# Patient Record
Sex: MALE | Race: WHITE | Employment: FULL TIME | URBAN - METROPOLITAN AREA
[De-identification: names, ages, dates, MRNs, and addresses within clinical notes are randomized per-mention and may not be internally consistent; named-entity substitution may affect disease eponyms.]

---

## 2021-03-02 ENCOUNTER — OFFICE VISIT (OUTPATIENT)
Dept: FAMILY MEDICINE CLINIC | Facility: CLINIC | Age: 26
End: 2021-03-02
Payer: COMMERCIAL

## 2021-03-02 VITALS
HEIGHT: 67 IN | DIASTOLIC BLOOD PRESSURE: 72 MMHG | BODY MASS INDEX: 27.15 KG/M2 | HEART RATE: 84 BPM | SYSTOLIC BLOOD PRESSURE: 120 MMHG | OXYGEN SATURATION: 98 % | WEIGHT: 173 LBS

## 2021-03-02 DIAGNOSIS — J45.20 MILD INTERMITTENT ASTHMA WITHOUT COMPLICATION: ICD-10-CM

## 2021-03-02 DIAGNOSIS — S49.91XA INJURY OF RIGHT SHOULDER, INITIAL ENCOUNTER: ICD-10-CM

## 2021-03-02 DIAGNOSIS — Z00.00 PHYSICAL EXAM, ANNUAL: Primary | ICD-10-CM

## 2021-03-02 DIAGNOSIS — F43.9 STRESS: ICD-10-CM

## 2021-03-02 PROCEDURE — 3008F BODY MASS INDEX DOCD: CPT | Performed by: FAMILY MEDICINE

## 2021-03-02 PROCEDURE — 3074F SYST BP LT 130 MM HG: CPT | Performed by: FAMILY MEDICINE

## 2021-03-02 PROCEDURE — 99385 PREV VISIT NEW AGE 18-39: CPT | Performed by: FAMILY MEDICINE

## 2021-03-02 PROCEDURE — 3078F DIAST BP <80 MM HG: CPT | Performed by: FAMILY MEDICINE

## 2021-03-02 RX ORDER — EPINEPHRINE 0.3 MG/.3ML
0.3 INJECTION SUBCUTANEOUS
COMMUNITY
Start: 2019-12-06

## 2021-03-02 NOTE — PROGRESS NOTES
Nay Camargo is a 32year old male who presents for a complete physical exam.   HPI:     Right shoulder has been popping in & out of place for past 2 months. Has been increasing exercise. Lots of pushups at home. Tweaks/pops with ER while arm is flexed. Cannabis           EXAM:   Wt Readings from Last 6 Encounters:  03/02/21 : 173 lb (78.5 kg)    Body mass index is 27.1 kg/m².     /72   Pulse 84   Ht 5' 7\" (1.702 m)   Wt 173 lb (78.5 kg)   SpO2 98%   BMI 27.10 kg/m²      GENERAL: well developed, wel booster every 10 years  -STI screening (GC/Chlamydia/HIV) which he declines    Meds & Refills for this Visit:  Requested Prescriptions      No prescriptions requested or ordered in this encounter       Imaging & Consults:  None    Return in 1 year for hira

## 2021-03-03 ENCOUNTER — TELEPHONE (OUTPATIENT)
Dept: FAMILY MEDICINE CLINIC | Facility: CLINIC | Age: 26
End: 2021-03-03

## 2021-03-03 NOTE — TELEPHONE ENCOUNTER
Letter completed sent to my chart. Called pt informed and provided activation for my chart. Pt verbalized understanding.

## 2021-06-20 ENCOUNTER — HOSPITAL ENCOUNTER (OUTPATIENT)
Age: 26
Discharge: HOME | End: 2021-06-20
Payer: COMMERCIAL

## 2021-06-20 VITALS
WEIGHT: 172 LBS | HEART RATE: 82 BPM | TEMPERATURE: 98 F | OXYGEN SATURATION: 99 % | DIASTOLIC BLOOD PRESSURE: 88 MMHG | HEIGHT: 67 IN | RESPIRATION RATE: 18 BRPM | SYSTOLIC BLOOD PRESSURE: 143 MMHG | BODY MASS INDEX: 27 KG/M2

## 2021-06-20 DIAGNOSIS — J02.9 SORE THROAT: ICD-10-CM

## 2021-06-20 DIAGNOSIS — Z87.09 HISTORY OF ASTHMA: ICD-10-CM

## 2021-06-20 DIAGNOSIS — J36 TONSILLAR ABSCESS: Primary | ICD-10-CM

## 2021-06-20 PROCEDURE — 87880 STREP A ASSAY W/OPTIC: CPT | Performed by: EMERGENCY MEDICINE

## 2021-06-20 PROCEDURE — 99204 OFFICE O/P NEW MOD 45 MIN: CPT | Performed by: EMERGENCY MEDICINE

## 2021-06-20 RX ORDER — HYDROCODONE BITARTRATE AND ACETAMINOPHEN 5; 325 MG/1; MG/1
1 TABLET ORAL ONCE
Status: COMPLETED | OUTPATIENT
Start: 2021-06-20 | End: 2021-06-20

## 2021-06-20 RX ORDER — PREDNISONE 20 MG/1
TABLET ORAL
Qty: 8 TABLET | Refills: 0 | Status: SHIPPED | OUTPATIENT
Start: 2021-06-21 | End: 2021-06-26

## 2021-06-20 RX ORDER — AMOXICILLIN AND CLAVULANATE POTASSIUM 875; 125 MG/1; MG/1
1 TABLET, FILM COATED ORAL 2 TIMES DAILY
Qty: 20 TABLET | Refills: 0 | Status: SHIPPED | OUTPATIENT
Start: 2021-06-20 | End: 2021-06-30

## 2021-06-20 RX ORDER — AMOXICILLIN AND CLAVULANATE POTASSIUM 875; 125 MG/1; MG/1
875 TABLET, FILM COATED ORAL ONCE
Status: COMPLETED | OUTPATIENT
Start: 2021-06-20 | End: 2021-06-20

## 2021-06-20 RX ORDER — PREDNISONE 20 MG/1
60 TABLET ORAL ONCE
Status: COMPLETED | OUTPATIENT
Start: 2021-06-20 | End: 2021-06-20

## 2021-06-20 RX ORDER — HYDROCODONE BITARTRATE AND ACETAMINOPHEN 5; 325 MG/1; MG/1
TABLET ORAL
Qty: 8 TABLET | Refills: 0 | Status: SHIPPED | OUTPATIENT
Start: 2021-06-20

## 2021-06-20 NOTE — ED PROVIDER NOTES
Patient Seen in: [facility]      History   No chief complaint on file.     Stated Complaint: Sore Throat    HPI/Subjective:   Romero Mcrae is a 32year old  male her for sore throat and swollen gland to the right side of his neck under his 18   Temp 97.5 °F (36.4 °C)   Temp src Temporal   SpO2 99 %   O2 Device        Current:/88   Pulse 82   Temp 97.5 °F (36.4 °C) (Temporal)   Resp 18   Ht 170.2 cm (5' 7\")   Wt 78 kg   SpO2 99%   BMI 26.94 kg/m²         Physical Exam  Vitals and nursi Fully vaccinated with no Covid exposure. Defer Covid testing. No hx of  immunocompromise. Nontoxic appearance. Patient with no trismus, muffled voice, or drooling. No airway compromise. Able to tolerate PO.   Gave prednisone, Norco, and first dose of A Sore throat  History of asthma     Disposition:  Discharge  6/20/2021 11:00 am    Follow-up:  DO Kira Cunningham  [facility] [address] [address]  [address] [place], [place] [address] Ruby  [address],[facility] [address]  [address] [address]  [phone]

## 2021-06-20 NOTE — ED INITIAL ASSESSMENT (HPI)
Pt states 2 days ago began having a sore throat and feeling swelling in right side of neck. Pt states painful to swallow.

## 2021-06-21 ENCOUNTER — OFFICE VISIT (OUTPATIENT)
Dept: OTOLARYNGOLOGY | Facility: CLINIC | Age: 26
End: 2021-06-21
Payer: COMMERCIAL

## 2021-06-21 VITALS
BODY MASS INDEX: 27 KG/M2 | SYSTOLIC BLOOD PRESSURE: 132 MMHG | HEART RATE: 69 BPM | HEIGHT: 67 IN | WEIGHT: 172 LBS | DIASTOLIC BLOOD PRESSURE: 75 MMHG

## 2021-06-21 DIAGNOSIS — J36 PERITONSILLAR CELLULITIS: Primary | ICD-10-CM

## 2021-06-21 PROCEDURE — 3078F DIAST BP <80 MM HG: CPT | Performed by: OTOLARYNGOLOGY

## 2021-06-21 PROCEDURE — 3075F SYST BP GE 130 - 139MM HG: CPT | Performed by: OTOLARYNGOLOGY

## 2021-06-21 PROCEDURE — 3008F BODY MASS INDEX DOCD: CPT | Performed by: OTOLARYNGOLOGY

## 2021-06-21 PROCEDURE — 99203 OFFICE O/P NEW LOW 30 MIN: CPT | Performed by: OTOLARYNGOLOGY

## 2021-06-21 NOTE — PROGRESS NOTES
Bard Moreno is a 32year old male. Patient presents with:   Tonsil Problem: peritonsillar abscess referred by David Coats pt started on prednisone, amoxicillin and norco for pain       HISTORY OF PRESENT ILLNESS  He presents with a history of throat infections, pigment change and rash. Hema/Lymph Negative Easy bleeding and easy bruising.            PHYSICAL EXAM    /75   Pulse 69   Ht 5' 7\" (1.702 m)   Wt 172 lb (78 kg)   BMI 26.94 kg/m²        Constitutional Normal Overall appearance - Normal conservatively. , Disp: 8 tablet, Rfl: 0  •  ALBUTEROL SULFATE HFA IN, Inhale into the lungs. , Disp: , Rfl:   •  EPINEPHrine 0.3 MG/0.3ML Injection Solution Auto-injector, Inject 0.3 mg into the muscle.  (Patient not taking: Reported on 6/20/2021 ), Disp:

## 2021-09-15 ENCOUNTER — TELEPHONE (OUTPATIENT)
Dept: FAMILY MEDICINE CLINIC | Facility: CLINIC | Age: 26
End: 2021-09-15

## 2021-09-15 DIAGNOSIS — H18.602 KERATOCONUS OF LEFT EYE: Primary | ICD-10-CM

## 2021-09-15 NOTE — TELEPHONE ENCOUNTER
Pt calling looking for a referral for Ophthalmology - Cornea specialist.    Contacted insurance and was given several provider names in [facility], but none of them treat his condition - Keratoconus.

## 2021-09-16 NOTE — TELEPHONE ENCOUNTER
Brice Giang, DO  [facility] [address] Dr. Elo Gomez 16 hours ago (4:16 PM)     Okay to place referral. Thank you. Message text    Referred placed informed pt and encouraged to call back on Monday to verify approval with insurance. Pt agrees.

## 2021-11-21 ENCOUNTER — HOSPITAL ENCOUNTER (OUTPATIENT)
Age: 26
Discharge: HOME | End: 2021-11-21
Payer: COMMERCIAL

## 2021-11-21 VITALS
SYSTOLIC BLOOD PRESSURE: 130 MMHG | TEMPERATURE: 97 F | OXYGEN SATURATION: 97 % | RESPIRATION RATE: 18 BRPM | HEART RATE: 65 BPM | DIASTOLIC BLOOD PRESSURE: 76 MMHG

## 2021-11-21 DIAGNOSIS — H10.32 ACUTE BACTERIAL CONJUNCTIVITIS OF LEFT EYE: Primary | ICD-10-CM

## 2021-11-21 PROCEDURE — 99213 OFFICE O/P EST LOW 20 MIN: CPT | Performed by: NURSE PRACTITIONER

## 2021-11-21 RX ORDER — ERYTHROMYCIN 5 MG/G
1 OINTMENT OPHTHALMIC EVERY 6 HOURS
Qty: 1 G | Refills: 0 | Status: SHIPPED | OUTPATIENT
Start: 2021-11-21 | End: 2021-11-28

## 2021-11-21 NOTE — ED PROVIDER NOTES
Patient Seen in: [facility]      History   Patient presents with:  Eye Problem    Stated Complaint: Pink Eye    Subjective:   Well-appearing 12-year-old male presents with complaints of waking up with left eye redness, and itching today oli pallor. Mucous membranes moist. Left and right tympanic membranes normal.      General: Lids are normal.         Right eye: No hordeolum. Left eye: No hordeolum. Extraocular Movements: Extraocular movements intact.       Conjunctiva/sclera: Medication List as of 11/21/2021 12:17 PM    START taking these medications    erythromycin 5 MG/GM Ophthalmic Ointment  Place 1 Application into the right eye every 6 (six) hours for 7 days. , Normal, Disp-1 g, R-0

## 2022-03-21 NOTE — TELEPHONE ENCOUNTER
EPINEPHrine 0.3 MG/0.3ML Injection Solution Auto-injector      ALBUTEROL SULFATE HFA IN      *both previously prescribed by outside provider.     [address] [facility] [address] [facility], [phone], [phone]    Last office visit:03/02/21

## 2022-03-21 NOTE — TELEPHONE ENCOUNTER
RN contacted pt, pt has not been seen in over a year. Pt scheduled physical with MP 4/5/22. Medications pended, pt unable to verify previous inhaler.  RN to route to .

## 2022-03-22 RX ORDER — ALBUTEROL SULFATE 90 UG/1
2 AEROSOL, METERED RESPIRATORY (INHALATION) EVERY 6 HOURS PRN
Qty: 1 EACH | Refills: 1 | Status: SHIPPED | OUTPATIENT
Start: 2022-03-22

## 2022-03-22 RX ORDER — EPINEPHRINE 0.3 MG/.3ML
0.3 INJECTION SUBCUTANEOUS ONCE
Qty: 1 EACH | Refills: 0 | Status: SHIPPED | OUTPATIENT
Start: 2022-03-22 | End: 2022-03-22

## 2022-04-05 ENCOUNTER — OFFICE VISIT (OUTPATIENT)
Dept: FAMILY MEDICINE CLINIC | Facility: CLINIC | Age: 27
End: 2022-04-05
Payer: COMMERCIAL

## 2022-04-05 VITALS
HEART RATE: 100 BPM | HEIGHT: 67 IN | OXYGEN SATURATION: 98 % | SYSTOLIC BLOOD PRESSURE: 136 MMHG | BODY MASS INDEX: 28.25 KG/M2 | DIASTOLIC BLOOD PRESSURE: 84 MMHG | WEIGHT: 180 LBS

## 2022-04-05 DIAGNOSIS — M53.80 BACK TIGHTNESS: ICD-10-CM

## 2022-04-05 DIAGNOSIS — Z01.00 ENCOUNTER FOR EXAMINATION OF VISION: ICD-10-CM

## 2022-04-05 DIAGNOSIS — K13.70 ORAL LESION: ICD-10-CM

## 2022-04-05 DIAGNOSIS — Z00.00 PHYSICAL EXAM, ANNUAL: Primary | ICD-10-CM

## 2022-04-05 PROCEDURE — 3075F SYST BP GE 130 - 139MM HG: CPT | Performed by: FAMILY MEDICINE

## 2022-04-05 PROCEDURE — 3008F BODY MASS INDEX DOCD: CPT | Performed by: FAMILY MEDICINE

## 2022-04-05 PROCEDURE — 99395 PREV VISIT EST AGE 18-39: CPT | Performed by: FAMILY MEDICINE

## 2022-04-05 PROCEDURE — 3079F DIAST BP 80-89 MM HG: CPT | Performed by: FAMILY MEDICINE

## 2022-07-26 RX ORDER — ALBUTEROL SULFATE 90 UG/1
2 AEROSOL, METERED RESPIRATORY (INHALATION) EVERY 6 HOURS PRN
Qty: 2 EACH | Refills: 1 | Status: SHIPPED | OUTPATIENT
Start: 2022-07-26

## 2022-07-26 NOTE — TELEPHONE ENCOUNTER
Patient stated he called a few weeks ago to the answering service for a refill request and did not hear anything back. He would like a refill for the following medication.  Patient stated wife is pregnant and her doctor recommended he get a t-dap.     albuterol

## 2022-08-11 ENCOUNTER — TELEPHONE (OUTPATIENT)
Dept: FAMILY MEDICINE CLINIC | Facility: CLINIC | Age: 27
End: 2022-08-11

## 2022-08-11 NOTE — TELEPHONE ENCOUNTER
The patient called and stated that he wants to have the tdap done before his baby arrives home at the end of the month. He also states it's a recommendation from his wife's OB.

## 2022-08-12 NOTE — TELEPHONE ENCOUNTER
Spoke to patient he states hes unsure of when he had his last tdap. Informed patient to try to find his vaccine record so we know if he is due for the tdap vaccine.

## 2022-08-22 ENCOUNTER — NURSE ONLY (OUTPATIENT)
Dept: FAMILY MEDICINE CLINIC | Facility: CLINIC | Age: 27
End: 2022-08-22
Payer: COMMERCIAL

## 2022-08-22 DIAGNOSIS — Z23 NEED FOR VACCINATION: Primary | ICD-10-CM

## 2022-08-22 PROCEDURE — 90471 IMMUNIZATION ADMIN: CPT | Performed by: FAMILY MEDICINE

## 2022-08-22 PROCEDURE — 90715 TDAP VACCINE 7 YRS/> IM: CPT | Performed by: FAMILY MEDICINE

## 2023-03-21 ENCOUNTER — HOSPITAL ENCOUNTER (OUTPATIENT)
Age: 28
Discharge: HOME | End: 2023-03-21
Payer: COMMERCIAL

## 2023-03-21 VITALS
SYSTOLIC BLOOD PRESSURE: 152 MMHG | OXYGEN SATURATION: 100 % | HEART RATE: 60 BPM | DIASTOLIC BLOOD PRESSURE: 85 MMHG | RESPIRATION RATE: 18 BRPM | TEMPERATURE: 99 F

## 2023-03-21 DIAGNOSIS — T78.40XA ALLERGIC REACTION, INITIAL ENCOUNTER: Primary | ICD-10-CM

## 2023-03-21 PROCEDURE — 99213 OFFICE O/P EST LOW 20 MIN: CPT | Performed by: NURSE PRACTITIONER

## 2023-03-21 RX ORDER — PREDNISONE 20 MG/1
40 TABLET ORAL DAILY
Qty: 8 TABLET | Refills: 0 | Status: SHIPPED | OUTPATIENT
Start: 2023-03-21 | End: 2023-03-25

## 2023-03-21 RX ORDER — PREDNISONE 20 MG/1
40 TABLET ORAL ONCE
Status: COMPLETED | OUTPATIENT
Start: 2023-03-21 | End: 2023-03-21

## 2023-03-21 RX ORDER — EPINEPHRINE 0.3 MG/.3ML
0.3 INJECTION SUBCUTANEOUS
Qty: 1 EACH | Refills: 0 | Status: SHIPPED | OUTPATIENT
Start: 2023-03-21 | End: 2023-04-20

## 2023-03-21 NOTE — ED INITIAL ASSESSMENT (HPI)
Pt is worried he is having an allergic reaction (itchy throat) from something he ingested. Pt began feeling an itchy throat approx 530p after eating dinner. Denies any new or unusual foods. Pt took 2 tablets of benadryl prior to coming to IC.

## 2023-03-21 NOTE — DISCHARGE INSTRUCTIONS
Prednisone 40 mg daily for 4 days, take with food  Zyrtec 10 mg daily for 1 week  If you develop chest pain, shortness of breath, vomiting, stomach pain, hives or any new or worsening symptoms please go to the ER  I have refilled your epinephrine, please use this in case of severe allergic reaction and then proceed to ER

## 2023-11-09 RX ORDER — ALBUTEROL SULFATE 90 UG/1
2 AEROSOL, METERED RESPIRATORY (INHALATION) EVERY 6 HOURS PRN
Qty: 2 EACH | Refills: 1 | Status: SHIPPED | OUTPATIENT
Start: 2023-11-09

## 2023-11-09 NOTE — TELEPHONE ENCOUNTER
Please review; protocol failed. Message sent for patient to make an appointment. Requested Prescriptions   Pending Prescriptions Disp Refills    albuterol 108 (90 Base) MCG/ACT Inhalation Aero Soln 2 each 1     Sig: Inhale 2 puffs into the lungs every 6 (six) hours as needed for Wheezing.        Asthma & COPD Medication Protocol Failed - 11/9/2023 10:51 AM        Failed - In person appointment or virtual visit in the past 6 mos or appointment in next 3 mos     Recent Outpatient Visits              1 year ago     [address], [place], [place] po[place]    Nurse Only    1 year ago Physical exam, annual    [facility]    Office Visit    2 years ago Peritonsillar cellulitis    Prashant Patel MD    Office Visit    2 years ago Physical exam, annual    Emily Beaulieu DO    Office Visit                         Recent Outpatient Visits              1 year ago     [address], [place], Emily poon    Nurse Only    1 year ago Physical exam, annual    Edgardo Beaulieu [facility], DO    Office Visit    2 years ago Peritonsillar cellulitis    Emily Beaulieu MD    Office Visit    2 years ago Physical exam, annual    [facility]    Office Visit

## 2023-11-09 NOTE — TELEPHONE ENCOUNTER
Patient is calling requesting refill on the following medication.       albuterol 108 (90 Base) MCG/ACT Inhalation Aero [id number] - Stefan Daugherty, [address] [facility] AT [address] [place] [address], [phone], [phone] Stable continue present therapy

## 2024-04-04 ENCOUNTER — TELEPHONE (OUTPATIENT)
Facility: CLINIC | Age: 29
End: 2024-04-04

## 2024-04-04 NOTE — TELEPHONE ENCOUNTER
The patient called for two referrals . The one 's he had in the chart  and he wanted to know if he needed to come in or can he have them renewed. He needs referrals for Ophthalmology and an ENT doctor.

## 2024-04-29 ENCOUNTER — OFFICE VISIT (OUTPATIENT)
Facility: CLINIC | Age: 29
End: 2024-04-29
Payer: COMMERCIAL

## 2024-04-29 VITALS
BODY MASS INDEX: 24.48 KG/M2 | SYSTOLIC BLOOD PRESSURE: 128 MMHG | HEART RATE: 85 BPM | OXYGEN SATURATION: 98 % | HEIGHT: 67 IN | WEIGHT: 156 LBS | DIASTOLIC BLOOD PRESSURE: 80 MMHG

## 2024-04-29 DIAGNOSIS — K13.70 ORAL LESION: ICD-10-CM

## 2024-04-29 DIAGNOSIS — Z00.00 PHYSICAL EXAM, ANNUAL: Primary | ICD-10-CM

## 2024-04-29 DIAGNOSIS — H18.609 KERATOCONUS, UNSPECIFIED LATERALITY: ICD-10-CM

## 2024-04-29 NOTE — PROGRESS NOTES
Paul Kovacs is a 29 year old male who presents for a complete physical exam.   HPI:     Needs referral for eye doctor. Never scheduled with ophtho.  Never scheduled with ENT either.     Otherwise doing well. Recently had another son who is now 1mo. Limited time. Will resume regular exercise soon though. Diet is okay. Eats lots of meat.     Concerns: Above  Last colonoscopy:  Never  Last PSA: Never  Diet/exercise: Above  Hx of STI screening: No hx or concerns  Substance abuse: None  Vaccines- Tdap: 08/2022, Covid: consider new booster    REVIEW OF SYSTEMS:   GENERAL: feels well otherwise   SKIN: denies any unusual skin lesions  EYES:denies vision change  HEENT: no hearing changes, negative  LUNGS: denies shortness of breath with exertion  CARDIOVASCULAR: denies chest pain on exertion  GI: denies abdominal pain, constipation or diarrhea. No hematochezia or melena  : denies nocturia or changes in stream  NEURO: denies headaches  PSYCHE: denies depression or anxiety    Current Outpatient Medications   Medication Sig Dispense Refill    albuterol 108 (90 Base) MCG/ACT Inhalation Aero Soln Inhale 2 puffs into the lungs every 6 (six) hours as needed for Wheezing. 2 each 1      Past Medical History:    COVID-19 vaccine series completed    second vaccine 04/29/2021. Series done at outside location.     Mild intermittent asthma without complication (HCC)      Past Surgical History:   Procedure Laterality Date    [place] teeth removed        History reviewed. No pertinent family history.   Social History:  Social History     Socioeconomic History    Marital status:    Tobacco Use    Smoking status: Never    Smokeless tobacco: Former   Vaping Use    Vaping status: Some Days   Substance and Sexual Activity    Alcohol use: Yes    Drug use: Yes     Types: Cannabis    Sexual activity: Yes           EXAM:     Wt Readings from Last 6 Encounters:   04/29/24 156 lb (70.8 kg)   04/05/22 180 lb (81.6 kg)   06/21/21 172 lb (78  kg)   06/20/21 172 lb (78 kg)   03/02/21 173 lb (78.5 kg)     Body mass index is 24.43 kg/m².    /80   Pulse 85   Ht 5' 7\" (1.702 m)   Wt 156 lb (70.8 kg)   SpO2 98%   BMI 24.43 kg/m²      GENERAL: well developed, well nourished, in no apparent distress  SKIN: no rashes, no suspicious lesions  HEENT: atraumatic, normocephalic, MMM, nose normal, Tms & EACs normal. Papule of left lower lip.  EYES: PERRLA, EOMI, no conjunctival injection  NECK: supple, no adenopathy   LUNGS: CTA b/l, no w/r/r  CARDIO: RRR without murmur  GI: normoactive bowel sounds, NT/ND, no masses, no HSM  EXTREMITIES: no cyanosis, clubbing or edema  NEURO: Alert & oriented, motor and sensory are grossly intact    No results found for: \"CHOLEST\", \"HDL\", \"LDL\", \"TRIGLY\", \"AST\", \"ALT\"   ASSESSMENT AND PLAN:   Paul Kovacs is a 29 year old male who presents for a complete physical exam.    Encounter Diagnoses   Name Primary?    Physical exam, annual Yes    Keratoconus, unspecified laterality     Oral lesion      Orders Placed This Encounter   Procedures    Hemoglobin A1C    Comp Metabolic Panel (14)    Lipid Panel    HCV Antibody       -Ophtho referral generated per request.  -Oral lesion: Again recommend ENT evaluation. Referral generated.  -Baseline labs ordered.     Discussed with patient the following:  -Risks and benefits of screening for prostate cancer:  -Colon cancer screening   -Healthy diet including adequate intake of vegetables and fruits, appropriate portion sizes, minimizing highly concentrated carbohydrate foods  -Exercising 30 minutes a day most days of the week   -Importance of regular exercise and weight maintenance/loss   -Diabetes screening   -Cholesterol screening   -Recommendation for yearly influenza vaccine  -Need for Tdap once as an adult and Td booster every 10 years  -Hepatitis C screening     Meds & Refills for this Visit:  Requested Prescriptions      No prescriptions requested or ordered in this encounter        Imaging & Consults:  OPHTHALMOLOGY - INTERNAL  ENT - INTERNAL    Return in 1 year for annual physical or sooner as needed.     Tapan Frias,   04/29/24   2:14 PM

## 2024-05-01 ENCOUNTER — OFFICE VISIT (OUTPATIENT)
Dept: OPHTHALMOLOGY | Facility: CLINIC | Age: 29
End: 2024-05-01

## 2024-05-01 ENCOUNTER — LAB ENCOUNTER (OUTPATIENT)
Dept: LAB | Facility: HOSPITAL | Age: 29
End: 2024-05-01
Attending: FAMILY MEDICINE
Payer: COMMERCIAL

## 2024-05-01 DIAGNOSIS — H52.13 MYOPIA OF BOTH EYES WITH ASTIGMATISM: Primary | ICD-10-CM

## 2024-05-01 DIAGNOSIS — Z00.00 PHYSICAL EXAM, ANNUAL: ICD-10-CM

## 2024-05-01 DIAGNOSIS — H52.203 MYOPIA OF BOTH EYES WITH ASTIGMATISM: Primary | ICD-10-CM

## 2024-05-01 LAB
ALBUMIN SERPL-MCNC: 4.6 G/DL (ref 3.2–4.8)
ALBUMIN/GLOB SERPL: 1.5 {RATIO} (ref 1–2)
ALP LIVER SERPL-CCNC: 48 U/L
ALT SERPL-CCNC: 14 U/L
ANION GAP SERPL CALC-SCNC: 4 MMOL/L (ref 0–18)
AST SERPL-CCNC: 25 U/L (ref ?–34)
BILIRUB SERPL-MCNC: 1.2 MG/DL (ref 0.3–1.2)
BUN BLD-MCNC: 12 MG/DL (ref 9–23)
BUN/CREAT SERPL: 12.6 (ref 10–20)
CALCIUM BLD-MCNC: 9.8 MG/DL (ref 8.7–10.4)
CHLORIDE SERPL-SCNC: 108 MMOL/L (ref 98–112)
CHOLEST SERPL-MCNC: 132 MG/DL (ref ?–200)
CO2 SERPL-SCNC: 30 MMOL/L (ref 21–32)
CREAT BLD-MCNC: 0.95 MG/DL
EGFRCR SERPLBLD CKD-EPI 2021: 111 ML/MIN/1.73M2 (ref 60–?)
EST. AVERAGE GLUCOSE BLD GHB EST-MCNC: 91 MG/DL (ref 68–126)
FASTING PATIENT LIPID ANSWER: NO
FASTING STATUS PATIENT QL REPORTED: NO
GLOBULIN PLAS-MCNC: 3 G/DL (ref 2.8–4.4)
GLUCOSE BLD-MCNC: 78 MG/DL (ref 70–99)
HBA1C MFR BLD: 4.8 % (ref ?–5.7)
HCV AB SERPL QL IA: NONREACTIVE
HDLC SERPL-MCNC: 55 MG/DL (ref 40–59)
LDLC SERPL CALC-MCNC: 68 MG/DL (ref ?–100)
NONHDLC SERPL-MCNC: 77 MG/DL (ref ?–130)
OSMOLALITY SERPL CALC.SUM OF ELEC: 293 MOSM/KG (ref 275–295)
POTASSIUM SERPL-SCNC: 4.6 MMOL/L (ref 3.5–5.1)
PROT SERPL-MCNC: 7.6 G/DL (ref 5.7–8.2)
SODIUM SERPL-SCNC: 142 MMOL/L (ref 136–145)
TRIGL SERPL-MCNC: 36 MG/DL (ref 30–149)
VLDLC SERPL CALC-MCNC: 5 MG/DL (ref 0–30)

## 2024-05-01 PROCEDURE — 92015 DETERMINE REFRACTIVE STATE: CPT | Performed by: OPHTHALMOLOGY

## 2024-05-01 PROCEDURE — 83036 HEMOGLOBIN GLYCOSYLATED A1C: CPT

## 2024-05-01 PROCEDURE — 92004 COMPRE OPH EXAM NEW PT 1/>: CPT | Performed by: OPHTHALMOLOGY

## 2024-05-01 PROCEDURE — 36415 COLL VENOUS BLD VENIPUNCTURE: CPT

## 2024-05-01 PROCEDURE — 80061 LIPID PANEL: CPT

## 2024-05-01 PROCEDURE — 86803 HEPATITIS C AB TEST: CPT

## 2024-05-01 PROCEDURE — 80053 COMPREHEN METABOLIC PANEL: CPT

## 2024-05-01 NOTE — PATIENT INSTRUCTIONS
Myopia of both eyes with astigmatism  Try glasses RX; if there is enough improvement, no further treatment is needed.  If he is still having problems with vision in the left eye to consider scheduling an appointment with Dr. Javier Benites or Dr. Sheridan at [facility].  Discussed with patient that he does not appear to have keratoconus in either eye.

## 2024-05-01 NOTE — ASSESSMENT & PLAN NOTE
Try glasses RX; if there is enough improvement, no further treatment is needed.  If he is still having problems with vision in the left eye to consider scheduling an appointment with Dr. Javier Benites or Dr. Sheridan at [facility].  Discussed with patient that he does not appear to have keratoconus in either eye.

## 2024-05-01 NOTE — PROGRESS NOTES
Paul Kovacs is a 29 year old male.    HPI:     HPI    Pt. Here for a complete eye exam.   C/O blurry vision for distance and tired eyes after prolonged computer work. States wants to see better.    have been seeing many doctors for CL or glasses or LASIK but all of them said the same ting that the vision could not be improved with any of them. ( was not a  candidate for LASIK \"because of the shape of the eyes\")   Last EE 4 years ago.      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       Last edited by Roberta Huddleston OT on 5/1/2024 11:02 AM.        Patient History:  Past Medical History:    COVID-19 vaccine series completed    second vaccine 04/29/2021. Series done at outside location.     Mild intermittent asthma without complication (HCC)       Surgical History: Paul Kovacs has a past surgical history that includes wisdom teeth removed.    Family History   Problem Relation Age of Onset    Macular degeneration Neg     Glaucoma Neg        Social History:   Social History     Socioeconomic History    Marital status:    Tobacco Use    Smoking status: Never    Smokeless tobacco: Former   Vaping Use    Vaping status: Some Days   Substance and Sexual Activity    Alcohol use: Yes    Drug use: Yes     Types: Cannabis    Sexual activity: Yes       Medications:  Current Outpatient Medications   Medication Sig Dispense Refill    albuterol 108 (90 Base) MCG/ACT Inhalation Aero Soln Inhale 2 puffs into the lungs every 6 (six) hours as needed for Wheezing. 2 each 1       Allergies:  Allergies   Allergen Reactions    Pea Extract SWELLING     Tongue and throat swelling    Peanut Oil SWELLING     Tongue and throat swelling    Peanuts SWELLING    Peas SWELLING    Shrimp Flavor SWELLING       ROS:       PHYSICAL EXAM:     Base Eye Exam       Visual Acuity (Snellen - Linear)          Right Left    Dist sc 20/40 20/50 -2    Dist ph sc 20/20 20/30 +2    Near sc 20/20 20/25              Tonometry (Applanation, 11:31 AM)         Right Left    Pressure 13 14              Pupils         Pupils    Right PERRL    Left PERRL              Visual Fields         Left Right     Full Full              Extraocular Movement         Right Left     Full Full              Dilation       Both eyes: 1.0% Mydriacyl and 2.5% Sabino Synephrine @ 11:33 AM              Dilation #2       Both eyes: 1.0% Mydriacyl and 2.5% Sabino Synephrine @ 11:33 AM                  Additional Tests       Keratometry         K1 Axis K2 Axis Mires    Right 42.00 120 42.25 30     Left 41.50 170 43.25 80               Keratometry #2 (Manual- RJM)         K1 Axis K2 Axis Mires    Right 43.25  43.25 spheical clear    Left 42.75  44.25 90 clear                  Slit Lamp and Fundus Exam       Slit Lamp Exam         Right Left    Lids/Lashes Meibomian gland dysfunction Meibomian gland dysfunction    Conjunctiva/Sclera Normal Normal    Cornea no cone, or thinning minimal apical thinning centrally    Anterior Chamber Deep and quiet Deep and quiet    Iris Normal Normal    Lens Clear Clear    Vitreous Clear Clear              Fundus Exam         Right Left    Disc Good rim Good rim    C/D Ratio 0.5 0.5    Macula Normal Normal    Vessels Normal Normal    Periphery Normal Normal                  Refraction       Manifest Refraction (Auto)         Sphere Cylinder [place] Dist VA    Right -1.25 +0.25 010     Left -2.25 +2.25 075               Manifest Refraction #2         Sphere Cylinder [place] Dist VA    Right -1.25 +0.25 010 20/20    Left -2.25 +2.75 035 20/30              Manifest Refraction Comments    Feels slight improvement in the vision with glasses.   Requesting a new glasses Rx.              Cycloplegic Refraction (Auto)         Sphere Cylinder [place] Dist VA    Right -1.00 +0.25 005     Left -2.25 +2.75 075               Cycloplegic Refraction #2          Sphere Cylinder [place] Dist VA    Right -1.25 +0.25 010 20/20    Left -2.25 +2.25 035 20/30              Final Rx         Sphere Cylinder [place] Dist VA    Right -1.25 +0.25 010 20/20    Left -2.25 +2.75 035 20/30      Type: Single vision                     ASSESSMENT/PLAN:     Diagnoses and Plan:     Myopia of both eyes with astigmatism  Try glasses RX; if there is enough improvement, no further treatment is needed.  If he is still having problems with vision in the left eye to consider scheduling an appointment with Dr. Javier Benites or Dr. Sheridan at [facility].  Discussed with patient that he does not appear to have keratoconus in either eye.      No orders of the defined types were placed in this encounter.      Meds This Visit:  Requested Prescriptions      No prescriptions requested or ordered in this encounter        Follow up instructions:  Return if symptoms worsen or fail to improve.    5/1/2024  Scribed by: Scot Haywood MD

## 2024-05-02 ENCOUNTER — OFFICE VISIT (OUTPATIENT)
Dept: OTOLARYNGOLOGY | Facility: CLINIC | Age: 29
End: 2024-05-02
Payer: COMMERCIAL

## 2024-05-02 VITALS — HEIGHT: 67 IN | WEIGHT: 156 LBS | BODY MASS INDEX: 24.48 KG/M2

## 2024-05-02 DIAGNOSIS — D10.0 FIBROMA OF LIP: Primary | ICD-10-CM

## 2024-05-02 PROCEDURE — 99213 OFFICE O/P EST LOW 20 MIN: CPT | Performed by: OTOLARYNGOLOGY

## 2024-05-02 NOTE — PROGRESS NOTES
Paul Kovacs is a 29 year old male.    Chief Complaint   Patient presents with    Mouth Lesions     Patient reports oral lesion in left side of mouth.       HISTORY OF PRESENT ILLNESS  He presents with a history of throat pain.  Has difficulty swallowing went to urgent care yesterday diagnosed with peritonsillar abscess started on prednisone amoxicillin/clavulanic acid and Norco for pain and is done quite well.  Now able to tolerate a diet without difficulty.  Feels that the swelling in his throat and in his neck is gone down.  No previous history of tonsillar infection.     5/2/24 I saw him in 2021 and states that in the past year she has noted an enlarging lesion of his lip that he is concerned about.  He does state that this may have originally occurred by biting himself and that it is simply increased in size over time.  He does admit to sometimes biting it as well.  No other signs, symptoms or complaint      Social History     Socioeconomic History    Marital status:    Tobacco Use    Smoking status: Never    Smokeless tobacco: Former   Vaping Use    Vaping status: Some Days   Substance and Sexual Activity    Alcohol use: Yes    Drug use: Yes     Types: Cannabis    Sexual activity: Yes       Family History   Problem Relation Age of Onset    Macular degeneration Neg     Glaucoma Neg        Past Medical History:    COVID-19 vaccine series completed    second vaccine 04/29/2021. Series done at outside location.     Mild intermittent asthma without complication (HCC)       Past Surgical History:   Procedure Laterality Date    [place] teeth removed           REVIEW OF SYSTEMS    System Neg/Pos Details   Constitutional Negative Fatigue, fever and weight loss.   ENMT Negative Drooling.   Eyes Negative Blurred vision and vision changes.   Respiratory Negative Dyspnea and wheezing.   Cardio Negative Chest pain, irregular heartbeat/palpitations and syncope.   GI Negative Abdominal pain and diarrhea.   Endocrine  Negative Cold intolerance and heat intolerance.   Neuro Negative Tremors.   Psych Negative Anxiety and depression.   Integumentary Negative Frequent skin infections, pigment change and rash.   Hema/Lymph Negative Easy bleeding and easy bruising.           PHYSICAL EXAM    Ht 5' 7\" (1.702 m)   Wt 156 lb (70.8 kg)   BMI 24.43 kg/m²        Constitutional Normal Overall appearance - Normal.   Psychiatric Normal Orientation - Oriented to time, place, person & situation. Appropriate mood and affect.   Neck Exam Normal Inspection - Normal. Palpation - Normal. Parotid gland - Normal. Thyroid gland - Normal.   Eyes Normal Conjunctiva - Right: Normal, Left: Normal. Pupil - Right: Normal, Left: Normal. Fundus - Right: Normal, Left: Normal.   Neurological Normal Memory - Normal. Cranial nerves - Cranial nerves II through XII grossly intact.   Head/Face Normal Facial features - Normal. Eyebrows - Normal. Skull - Normal.        Nasopharynx Normal External nose - Normal. Lips/teeth/gums - Normal. Tonsils - Normal. Oropharynx - Normal.   Ears Normal Inspection - Right: Normal, Left: Normal. Canal - Right: Normal, Left: Normal. TM - Right: Normal, Left: Normal.   Skin Normal Inspection - Normal.        Lymph Detail Normal Submental. Submandibular. Anterior cervical. Posterior cervical. Supraclavicular.        Nose/Mouth/Throat Normal External nose - Normal. Lips-lower lip bite fibroma 8 mm in size/teeth/gums - Normal. Tonsils - Normal. Oropharynx - Normal.   Nose/Mouth/Throat Normal Nares - Right: Normal Left: Normal. Septum -Normal  Turbinates - Right: Normal, Left: Normal.       Current Outpatient Medications:     albuterol 108 (90 Base) MCG/ACT Inhalation Aero Soln, Inhale 2 puffs into the lungs every 6 (six) hours as needed for Wheezing., Disp: 2 each, Rfl: 1  ASSESSMENT AND PLAN    1. Fibroma of lip  This appears to be a benign fibroma of the lower lip.  I did recommend excising this under local anesthesia in the office.  We  discussed the risk of surgery to include poor cosmesis and recurrence.  He accepts these risks and wishes to proceed        This note was prepared using Dragon Medical voice recognition dictation software. As a result errors may occur. When identified these errors have been corrected. While every attempt is made to correct errors during dictation discrepancies may still exist    Javi Fuentes MD    5/2/2024    6:00 PM

## 2024-05-07 ENCOUNTER — OFFICE VISIT (OUTPATIENT)
Dept: OTOLARYNGOLOGY | Facility: CLINIC | Age: 29
End: 2024-05-07

## 2024-05-07 ENCOUNTER — TELEPHONE (OUTPATIENT)
Dept: OTOLARYNGOLOGY | Facility: CLINIC | Age: 29
End: 2024-05-07

## 2024-05-07 DIAGNOSIS — D10.0 FIBROMA OF LIP: ICD-10-CM

## 2024-05-07 DIAGNOSIS — K13.0 LIP LESION: Primary | ICD-10-CM

## 2024-05-07 PROCEDURE — 40814 EXCISE/REPAIR MOUTH LESION: CPT | Performed by: OTOLARYNGOLOGY

## 2024-05-07 RX ORDER — CEPHALEXIN 500 MG/1
500 CAPSULE ORAL EVERY 8 HOURS
Qty: 21 CAPSULE | Refills: 0 | Status: SHIPPED | OUTPATIENT
Start: 2024-05-07

## 2024-05-07 NOTE — PROGRESS NOTES
Paul Kovacs is a 29 year old male.    Chief Complaint   Patient presents with    Procedure     Removal of fibroma of lip.        HISTORY OF PRESENT ILLNESS  He presents with a history of throat pain.  Has difficulty swallowing went to urgent care yesterday diagnosed with peritonsillar abscess started on prednisone amoxicillin/clavulanic acid and Norco for pain and is done quite well.  Now able to tolerate a diet without difficulty.  Feels that the swelling in his throat and in his neck is gone down.  No previous history of tonsillar infection.     5/2/24 I saw him in 2021 and states that in the past year she has noted an enlarging lesion of his lip that he is concerned about.  He does state that this may have originally occurred by biting himself and that it is simply increased in size over time.  He does admit to sometimes biting it as well.  No other signs, symptoms or complaint     5/7/24 he presents today to undergo excision of a left lower lip lesion.              Social History     Socioeconomic History    Marital status:    Tobacco Use    Smoking status: Never    Smokeless tobacco: Former   Vaping Use    Vaping status: Some Days   Substance and Sexual Activity    Alcohol use: Yes    Drug use: Yes     Types: Cannabis    Sexual activity: Yes       Family History   Problem Relation Age of Onset    Macular degeneration Neg     Glaucoma Neg        Past Medical History:    COVID-19 vaccine series completed    second vaccine 04/29/2021. Series done at outside location.     Mild intermittent asthma without complication (HCC)       Past Surgical History:   Procedure Laterality Date    [place] teeth removed           REVIEW OF SYSTEMS    System Neg/Pos Details   Constitutional Negative Fatigue, fever and weight loss.   ENMT Negative Drooling.   Eyes Negative Blurred vision and vision changes.   Respiratory Negative Dyspnea and wheezing.   Cardio Negative Chest pain, irregular heartbeat/palpitations and  syncope.   GI Negative Abdominal pain and diarrhea.   Endocrine Negative Cold intolerance and heat intolerance.   Neuro Negative Tremors.   Psych Negative Anxiety and depression.   Integumentary Negative Frequent skin infections, pigment change and rash.   Hema/Lymph Negative Easy bleeding and easy bruising.           PHYSICAL EXAM    There were no vitals taken for this visit.       Constitutional Normal Overall appearance - Normal.   Psychiatric Normal Orientation - Oriented to time, place, person & situation. Appropriate mood and affect.   Neck Exam Normal Inspection - Normal. Palpation - Normal. Parotid gland - Normal. Thyroid gland - Normal.   Eyes Normal Conjunctiva - Right: Normal, Left: Normal. Pupil - Right: Normal, Left: Normal. Fundus - Right: Normal, Left: Normal.   Neurological Normal Memory - Normal. Cranial nerves - Cranial nerves II through XII grossly intact.   Head/Face Normal Facial features - Normal. Eyebrows - Normal. Skull - Normal.        Nasopharynx Normal External nose - Normal. Lips/teeth/gums - Normal. Tonsils - Normal. Oropharynx - Normal.   Ears Normal Inspection - Right: Normal, Left: Normal. Canal - Right: Normal, Left: Normal. TM - Right: Normal, Left: Normal.   Skin Normal Inspection - Normal.        Lymph Detail Normal Submental. Submandibular. Anterior cervical. Posterior cervical. Supraclavicular.        Nose/Mouth/Throat Normal External nose - Normal. Lips-0.8 cm fibroma left lower lip/teeth/gums - Normal. Tonsils - Normal. Oropharynx - Normal.   Nose/Mouth/Throat Normal Nares - Right: Normal Left: Normal. Septum -Normal  Turbinates - Right: Normal, Left: Normal.   Excision of left lip lesion  Timeout was performed appropriate patient and site were identified.  The area in question of the left lower lip was infiltrated laterally with 1% Xylocaine with 1-100,000 epinephrine.  A 15 blade was used to excise the lesion in a fusiform fashion and the base cauterized with silver nitrate.   The mucosal edges were approximated everted and closed using a running locked 3-0 Chromic Gut suture.  The patient was discharged to home without any difficulty.  The specimen was sent in formalin to pathology.  No complications were noted.  Wound care was discussed and understood      Current Outpatient Medications:     albuterol 108 (90 Base) MCG/ACT Inhalation Aero Soln, Inhale 2 puffs into the lungs every 6 (six) hours as needed for Wheezing., Disp: 2 each, Rfl: 1  ASSESSMENT AND PLAN    1. Lip lesion  - Specimen to Pathology, Tissue; Future  - Specimen to Pathology, Tissue  - EXCIS MOUTH MUCOSA/SUB,COMPLX REPR    2. Fibroma of lip  Lip lesion excised without difficulty.  I have asked him to start some antibiotics for the next week or so and to use Tylenol or ibuprofen for pain.  See me as needed and we will call him with results of his pathology.        This note was prepared using Dragon Medical voice recognition dictation software. As a result errors may occur. When identified these errors have been corrected. While every attempt is made to correct errors during dictation discrepancies may still exist    Javi Fuentes MD    5/7/2024    6:25 PM

## 2024-05-07 NOTE — TELEPHONE ENCOUNTER
Patient called asking for the antibiotic prescribed at his visit today (5/7) to be sent to the [facility] in [place], [place].

## 2024-05-13 ENCOUNTER — TELEPHONE (OUTPATIENT)
Dept: OTOLARYNGOLOGY | Facility: CLINIC | Age: 29
End: 2024-05-13

## 2024-05-13 NOTE — TELEPHONE ENCOUNTER
Called to let patient know that pathology revealed a benign fibroma per Dr. Fuentes.  No further treatment indicated.  He's advised to call our office if he has any additional questions. Patient understood.

## 2024-08-22 ENCOUNTER — TELEPHONE (OUTPATIENT)
Age: 29
End: 2024-08-22

## 2024-08-22 NOTE — TELEPHONE ENCOUNTER
Dayton Miller,    I'm glad that we were able to connect. Here are some psychiatry resources that may be a good fit. Please verify your insurance coverage with any providers that you may choose to call and schedule with directly. If you need further assistance, you may give our office a call at [phone]. If you need more immediate assistance, or assistance outside of business hours, please contact the [facility] 24/7 helpline at [phone].    Kahlil Mazariegos MD  [address], [address], [place], [place], [address]  Phone: [phone]    BRENDA Arellano   [facility]  [address] [place] [address], [address], [place], [place], [address]  Phone: [phone]    Adrian Cartagena MD  [facility]  [id number] [place] [address], [address], [place] [address]  Phone: [phone]    Jessi Harris MD  [address], [address], [place], [address]  Phone: [phone] ext. [phone]    Allison BRUNER LCSW (she/her/hers)  Patient Care Navigator - Mental Health  [facility]ental [facility]    [email or web address]  Request an assessment or support »

## 2024-09-03 ENCOUNTER — TELEPHONE (OUTPATIENT)
Age: 29
End: 2024-09-03

## 2024-11-04 ENCOUNTER — HOSPITAL ENCOUNTER (OUTPATIENT)
Age: 29
Discharge: HOME | End: 2024-11-04
Payer: COMMERCIAL

## 2024-11-04 VITALS
RESPIRATION RATE: 18 BRPM | OXYGEN SATURATION: 100 % | HEART RATE: 51 BPM | SYSTOLIC BLOOD PRESSURE: 130 MMHG | DIASTOLIC BLOOD PRESSURE: 81 MMHG | TEMPERATURE: 98 F

## 2024-11-04 DIAGNOSIS — S05.02XA ABRASION OF LEFT CORNEA, INITIAL ENCOUNTER: Primary | ICD-10-CM

## 2024-11-04 PROCEDURE — 99214 OFFICE O/P EST MOD 30 MIN: CPT | Performed by: NURSE PRACTITIONER

## 2024-11-04 RX ORDER — OFLOXACIN 3 MG/ML
1 SOLUTION/ DROPS OPHTHALMIC 4 TIMES DAILY
Qty: 5 ML | Refills: 0 | Status: SHIPPED | OUTPATIENT
Start: 2024-11-04 | End: 2024-11-09

## 2024-11-04 RX ORDER — TETRACAINE HYDROCHLORIDE 5 MG/ML
1 SOLUTION OPHTHALMIC ONCE
Status: COMPLETED | OUTPATIENT
Start: 2024-11-04 | End: 2024-11-04

## 2024-11-05 NOTE — ED PROVIDER NOTES
Patient Seen in: [facility]      History     Chief Complaint   Patient presents with    Eye Visual Problem     Stated Complaint: Eye Problem    Subjective:   HPI      29-year-old male presents with left eye irritation tearing and light sensitivity after being poked by his son this a.m.  He does not use contacts. .    Objective:     Past Medical History:    COVID-19 vaccine series completed    second vaccine 04/29/2021. Series done at outside location.     Mild intermittent asthma without complication (HCC)              Past Surgical History:   Procedure Laterality Date    [place] teeth removed                  Social History     Socioeconomic History    Marital status:    Tobacco Use    Smoking status: Former    Smokeless tobacco: Former    Tobacco comments:     \"Every now and then\"   Vaping Use    Vaping status: Former    Substances: THC, Has been using daily for the past two years to relax   Substance and Sexual Activity    Alcohol use: Yes     Comment: occ    Drug use: Not Currently     Types: Cannabis    Sexual activity: Yes              Review of Systems    Positive for stated complaint: Eye Problem  Other systems are as noted in HPI.  Constitutional and vital signs reviewed.      All other systems reviewed and negative except as noted above.    Physical Exam     ED Triage Vitals [11/04/24 1752]   /81   Pulse 51   Resp 18   Temp 98.4 °F (36.9 °C)   Temp src Temporal   SpO2 100 %   O2 Device None (Room air)       Current Vitals:   Vital Signs  BP: 130/81  Pulse: 51  Resp: 18  Temp: 98.4 °F (36.9 °C)  Temp src: Temporal    Oxygen Therapy  SpO2: 100 %  O2 Device: None (Room air)      Right Eye Chart Acuity: 20/100, Uncorrected  Left Eye Chart Acuity: 20/40, Uncorrected  Physical Exam  Vitals and nursing note reviewed.   Constitutional:       Appearance: Normal appearance.   Eyes:      General:         Left eye: No foreign body or discharge.      Extraocular Movements:      Left eye:  Normal extraocular motion and no nystagmus.        Comments: Small corneal abrasion noted to the left mid pupil region negative Alia injection tearing noted   Cardiovascular:      Rate and Rhythm: Normal rate.   Musculoskeletal:      Cervical back: Normal range of motion.   Neurological:      Mental Status: He is alert.             ED Course   Labs Reviewed - No data to display                MDM    Corneal abrasion, conjunctivitis, laceration  Positive abrasion on exam will treat with [employer] support care close PMD ophthalmology follow-up if persist          Medical Decision Making  Problems Addressed:  Abrasion of left cornea, initial encounter: acute illness or injury with systemic symptoms    Risk  OTC drugs.  Prescription drug management.        Disposition and Plan     Clinical Impression:  1. Abrasion of left cornea, initial encounter         Disposition:  Discharge  11/4/2024  6:24 pm    Follow-up:  Tapan Cornelius MD  [address] [facility] [id number] [phone]    Schedule an appointment as soon as possible for a visit   If symptoms worsen or do not improve          Medications Prescribed:  Current Discharge Medication List        START taking these medications    Details   ofloxacin 0.3 % Ophthalmic Solution Place 1 drop into the left eye 4 (four) times daily for 5 days.  Qty: 5 mL, Refills: 0                 Supplementary Documentation:

## 2024-11-05 NOTE — ED INITIAL ASSESSMENT (HPI)
Pt c/o L eye redness, pain and inc tears after his son accidentally poked him with his finger this am.  No use of contacts.

## 2024-11-05 NOTE — DISCHARGE INSTRUCTIONS
Avoid bright lights  Ibuprofen as needed for pain  Antibiotics as prescribed  Follow-up with the eye specialist if symptoms do not improve or worsen

## 2025-02-06 ENCOUNTER — NURSE TRIAGE (OUTPATIENT)
Facility: CLINIC | Age: 30
End: 2025-02-06

## 2025-02-06 NOTE — TELEPHONE ENCOUNTER
Action Requested: Summary for Provider     []  Critical Lab, Recommendations Needed  [] Need Additional Advice  []   FYI    []   Need Orders  [] Need Medications Sent to Pharmacy  []  Other     SUMMARY:Pt requesting an earlier Appt 3/7/25, weight loss, last weighed 4/29/24 156 pounds, now 112, mentioned weight loss started about 2 months ago, poor appetite  but drink a lot of water   Pt available any day or time       Reason for call: Weight Loss  Onset 2 months                  Reason for Disposition   Nursing judgment    Protocols used: No Protocol Available - Sick Adult-A-[place]

## 2025-03-04 ENCOUNTER — NURSE TRIAGE (OUTPATIENT)
Facility: CLINIC | Age: 30
End: 2025-03-04

## 2025-03-04 ENCOUNTER — HOSPITAL ENCOUNTER (OUTPATIENT)
Age: 30
Discharge: HOME | End: 2025-03-04
Payer: COMMERCIAL

## 2025-03-04 VITALS
DIASTOLIC BLOOD PRESSURE: 89 MMHG | HEART RATE: 61 BPM | TEMPERATURE: 98 F | SYSTOLIC BLOOD PRESSURE: 129 MMHG | OXYGEN SATURATION: 100 % | RESPIRATION RATE: 16 BRPM

## 2025-03-04 DIAGNOSIS — Z20.818 STREPTOCOCCUS EXPOSURE: ICD-10-CM

## 2025-03-04 DIAGNOSIS — J02.9 PHARYNGITIS, UNSPECIFIED ETIOLOGY: Primary | ICD-10-CM

## 2025-03-04 LAB — S PYO AG THROAT QL: NEGATIVE

## 2025-03-04 PROCEDURE — 99213 OFFICE O/P EST LOW 20 MIN: CPT | Performed by: NURSE PRACTITIONER

## 2025-03-04 PROCEDURE — 87880 STREP A ASSAY W/OPTIC: CPT | Performed by: NURSE PRACTITIONER

## 2025-03-04 RX ORDER — AMOXICILLIN 500 MG/1
500 TABLET, FILM COATED ORAL 2 TIMES DAILY
Qty: 20 TABLET | Refills: 0 | Status: SHIPPED | OUTPATIENT
Start: 2025-03-04 | End: 2025-03-14

## 2025-03-04 NOTE — DISCHARGE INSTRUCTIONS
Make sure to stay well hydrated with clear fluids. You are considered contagious until you have been on the antibiotics for a full 24 hours. Make sure to take the full course of antibiotics, even if you begin to feel better. On day 3 of antibiotics throw out your toothbrush and toothpaste and get new ones so you do not continue to re-infect yourself. Cover your cough and wash your hands frequently to prevent the spread of infection. Do not share drinks or food. Control fever using Tylenol or Motrin every 6 hours. You can use both Tylenol and Motrin, but alternate them so the patient is getting one every 3 hours. Follow up with your primary care provider within the next 1-2 days. Seek additional care in the ER for new or worsening symptoms, difficulty breathing, difficulty swallowing/drooling, chest pain, fever that is not controlled with Tylenol & Motrin, or if rash develops.

## 2025-03-04 NOTE — TELEPHONE ENCOUNTER
Action Requested: Summary for Provider     []  Critical Lab, Recommendations Needed  [] Need Additional Advice  []   FYI    []   Need Orders  [] Need Medications Sent to Pharmacy  []  Other     SUMMARY: patient's son recently tested positive for strep throat.Patient has mild scratchy, sore throat and is requesting an order for a strep test. No fever, able to drink and eat. Advised home care per protocol. No office visits available, advised Walk in Clinic today.         Reason for call: Sore Throat  Onset: Data Unavailable                   Reason for Disposition   Patient requesting a strep throat test    Protocols used: Sore Throat-A-OH     Pt is A&Ox3, disoriented to place. Pt is resting in bed, no signs of labored breathing or pain. Pt on RA. Call light & personal belongings within reach, bed in lowest position & locked. Pt refuses bed alarm. Fall precautions in place and education provided on how to use call light. Pt updated on plan of care for the shift. Pt declines any additional needs at this time.

## 2025-03-04 NOTE — ED PROVIDER NOTES
Patient Seen in: [facility]    History   CC: sore throat  HPI: Paul Kovacs 30 year old male  who presents c/o sore throat beginning this morning upon waking.  States his son tested positive for strep yesterday.  Denies runny nose, coughing, fever, rash, GI signs/symptoms.    Past Medical History:    COVID-19 vaccine series completed    second vaccine 04/29/2021. Series done at outside location.     Mild intermittent asthma without complication (HCC)       Past Surgical History:   Procedure Laterality Date    [place] teeth removed         Family History   Problem Relation Age of Onset    Macular degeneration Neg     Glaucoma Neg        Social History     Socioeconomic History    Marital status:    Tobacco Use    Smoking status: Former    Smokeless tobacco: Former    Tobacco comments:     \"Every now and then\"   Vaping Use    Vaping status: Former    Substances: THC, Has been using daily for the past two years to relax   Substance and Sexual Activity    Alcohol use: Yes     Comment: occ    Drug use: Not Currently     Types: Cannabis    Sexual activity: Yes       ROS:  Systems reviewed: All pertinent positives noted in HPI. Unless otherwise noted, additional systems reviewed are negative.   Vital signs reviewed.    Positive for stated complaint: Sore Throat  Other systems are as noted in HPI.  Constitutional and vital signs reviewed.      All other systems reviewed and negative except as noted above.    PSFH elements reviewed from today and agreed except as otherwise stated in HPI.             Constitutional and vital signs reviewed.        Physical Exam     ED Triage Vitals [03/04/25 1040]   /89   Pulse 61   Resp 16   Temp 97.6 °F (36.4 °C)   Temp src Oral   SpO2 100 %   O2 Device None (Room air)       Current:/89   Pulse 61   Temp 97.6 °F (36.4 °C) (Oral)   Resp 16   SpO2 100%         PE:  General - Appears well, non-toxic and in NAD  Head - Appears symmetrical without  deformity/swelling cranium, scalp, or facial bones  Eyes - sclera not injected, no discharge noted, no periorbital edema  ENT - EAC bilaterally without discharge, TM pearly grey with COL visualized appropriately bilaterally.   no nasal drainage noted in nares bilat, no cobblestoning to post. Pharynx.   Oropharynx clear, posterior pharynx is diffusely erythematous without tonsilar enlargement or exudate, uvula midline, +gag, voice is clear. No trismus  Neck - no significant adenopathy, supple with trachea midline  Resp - Lung sounds clear bilaterally and wob unlabored, good aeration with equal, even expansion bilaterally   CV - RRR  Skin - no rashes or petechiae noted, pink warm and dry throughout, mmm, cap refill <2seconds  Neuro - A&O x4, steady gait  MSK - makes purposeful movements of all extremities  Psych - Interactive and appropriate      ED Course     Labs Reviewed   POCT RAPID STREP - Normal       MDM     DDx: Strep pharyngitis, viral pharyngitis, seasonal rhinitis    Rapid strep negative.  Symptoms started within the last few hours, discussed possibility of testing early.  Patient has a 2.5-year-old at home with strep, high risk for infection/transmissibility due to age of child and close contact.  Discussed antibiotic as prescribed as well as strep instructions and precautions reviewed, follow-up and return/ED precautions reviewed. Patient is historian and demonstrates understanding of all instruction and agrees with plan of care.      Disposition and Plan     Clinical Impression:  1. Pharyngitis, unspecified etiology    2. Streptococcus exposure        Disposition:  Discharge    Follow-up:  Tapan Frias DO  [address] [address]  [phone]    Go in 1 week  As needed      Medications Prescribed:  Discharge Medication List as of 3/4/2025 10:52 AM        START taking these medications    Details   amoxicillin 500 MG Oral Tab Take 1 tablet (500 mg total) by mouth 2 (two) times daily  for 10 days., Normal, Disp-20 tablet, R-0

## 2025-04-03 ENCOUNTER — OFFICE VISIT (OUTPATIENT)
Facility: CLINIC | Age: 30
End: 2025-04-03
Payer: COMMERCIAL

## 2025-04-03 VITALS
HEART RATE: 78 BPM | HEIGHT: 67 IN | SYSTOLIC BLOOD PRESSURE: 116 MMHG | BODY MASS INDEX: 24.48 KG/M2 | WEIGHT: 156 LBS | DIASTOLIC BLOOD PRESSURE: 76 MMHG | OXYGEN SATURATION: 98 %

## 2025-04-03 DIAGNOSIS — Z00.00 PHYSICAL EXAM, ANNUAL: Primary | ICD-10-CM

## 2025-04-03 DIAGNOSIS — Z91.018 ALLERGY TO PEAS: ICD-10-CM

## 2025-04-03 DIAGNOSIS — J45.20 MILD INTERMITTENT ASTHMA WITHOUT COMPLICATION (HCC): ICD-10-CM

## 2025-04-03 PROCEDURE — 99395 PREV VISIT EST AGE 18-39: CPT | Performed by: FAMILY MEDICINE

## 2025-04-03 PROCEDURE — 90471 IMMUNIZATION ADMIN: CPT | Performed by: FAMILY MEDICINE

## 2025-04-03 PROCEDURE — 90677 PCV20 VACCINE IM: CPT | Performed by: FAMILY MEDICINE

## 2025-04-03 RX ORDER — EPINEPHRINE 0.3 MG/.3ML
0.3 INJECTION SUBCUTANEOUS AS NEEDED
Qty: 1 EACH | Refills: 1 | Status: SHIPPED | OUTPATIENT
Start: 2025-04-03 | End: 2026-04-03

## 2025-04-03 RX ORDER — ALBUTEROL SULFATE 90 UG/1
2 INHALANT RESPIRATORY (INHALATION) EVERY 6 HOURS PRN
Qty: 2 EACH | Refills: 1 | Status: SHIPPED | OUTPATIENT
Start: 2025-04-03

## 2025-04-03 NOTE — PROGRESS NOTES
Paul Kovacs is a 30 year old male who presents for a complete physical exam.   HPI:     Had pea protein a couple weeks ago and had reaction (throat swelling). 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 Took benadryl and sx resolved. Needs refill of epipen.     Typically only needs albuterol with seasonal changes.     Concerns: Above  Last colonoscopy:  Never  Last PSA: Never  Diet/exercise: Exercises 1-2x/wk. Diet is okay. Limited time to cook as he has 2yo and 1yo sons.   Hx of STI screening: No hx or concerns  Substance abuse: None  Vaccines- Tdap: 08/2022, PCV20: Never     REVIEW OF SYSTEMS:   GENERAL: feels well otherwise. See HPI  SKIN: denies any unusual skin lesions  EYES:denies vision change  HEENT: no hearing changes, negative  LUNGS: denies shortness of breath with exertion  CARDIOVASCULAR: denies chest pain on exertion  GI: denies abdominal pain, constipation or diarrhea. No hematochezia or melena  : denies nocturia or changes in stream  NEURO: denies headaches  PSYCHE: denies depression or anxiety    Current Outpatient Medications   Medication Sig Dispense Refill    EPINEPHrine (EPIPEN 2-ANGEL) 0.3 MG/0.3ML Injection Solution Auto-injector Inject 0.3 mL (1 each total) as directed as needed. 1 each 1    albuterol 108 (90 Base) MCG/ACT Inhalation Aero Soln Inhale 2 puffs into the lungs every 6 (six) hours as needed for Wheezing. 2 each 1    escitalopram 10 MG Oral Tab Take 1 tablet (10 mg total) by mouth daily. 90 tablet 0      Past Medical History:    COVID-19 vaccine series completed    second vaccine 04/29/2021. Series done at outside location.     Mild intermittent asthma without complication (HCC)      Past Surgical History:   Procedure Laterality Date    [place] teeth removed        Family History   Problem Relation Age of Onset    Macular degeneration Neg     Glaucoma Neg       Social History:  Social History     Socioeconomic History    Marital status:    Tobacco Use    Smoking status: Former    Smokeless  tobacco: Former    Tobacco comments:     \"Every now and then\"   Vaping Use    Vaping status: Former    Substances: THC, Has been using daily for the past two years to relax   Substance and Sexual Activity    Alcohol use: Yes     Comment: occ    Drug use: Not Currently     Types: Cannabis    Sexual activity: Yes           EXAM:     Wt Readings from Last 6 Encounters:   04/03/25 156 lb (70.8 kg)   05/02/24 156 lb (70.8 kg)   04/29/24 156 lb (70.8 kg)   04/05/22 180 lb (81.6 kg)   06/21/21 172 lb (78 kg)   06/20/21 172 lb (78 kg)     Body mass index is 24.43 kg/m².    /76   Pulse 78   Ht 5' 7\" (1.702 m)   Wt 156 lb (70.8 kg)   SpO2 98%   BMI 24.43 kg/m²      GENERAL: well developed, well nourished, in no apparent distress  SKIN: no rashes, no suspicious lesions  HEENT: atraumatic, normocephalic, MMM, nose normal, Tms & EACs normal  EYES: PERRLA, EOMI, no conjunctival injection  NECK: supple, no adenopathy   LUNGS: CTA b/l, no w/r/r  CARDIO: RRR without murmur  GI: normoactive bowel sounds, NT/ND, no masses, no HSM  EXTREMITIES: no cyanosis, clubbing or edema  NEURO: Alert & oriented, motor and sensory are grossly intact    Cholesterol, Total (mg/dL)   Date Value   05/01/2024 132     HDL Cholesterol (mg/dL)   Date Value   05/01/2024 55     LDL Cholesterol (mg/dL)   Date Value   05/01/2024 68     AST (U/L)   Date Value   05/01/2024 25     ALT (U/L)   Date Value   05/01/2024 14      ASSESSMENT AND PLAN:   Paul Kovacs is a 30 year old male who presents for a complete physical exam.    Encounter Diagnoses   Name Primary?    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  -Asthma: Mild. Well-controlled on albuterol prn.  -Normal labs in 05/2024 thus will defer repeating at this time.  -PCV20 given today.     Discussed with patient the  following:  -Risks and benefits of screening for prostate cancer:  -Colon cancer screening   -Healthy diet including adequate intake of vegetables and fruits, appropriate portion sizes, minimizing highly concentrated carbohydrate foods  -Exercising 30 minutes a day most days of the week   -Importance of regular exercise and weight maintenance/loss   -Diabetes screening   -Cholesterol screening   -Recommendation for yearly influenza vaccine  -Need for Tdap once as an adult and Td booster every 10 years  -STI screening (GC/Chlamydia/HIV)  -Hepatitis C screening     Meds & Refills for this Visit:  Requested Prescriptions     Signed Prescriptions Disp Refills    EPINEPHrine (EPIPEN 2-ANGEL) 0.3 MG/0.3ML Injection Solution Auto-injector 1 each 1     Sig: Inject 0.3 mL (1 each total) as directed as needed.    albuterol 108 (90 Base) MCG/ACT Inhalation Aero Soln 2 each 1     Sig: Inhale 2 puffs into the lungs every 6 (six) hours as needed for Wheezing.       Imaging & Consults:  PCV20 VACCINE FOR INTRAMUSCULAR USE    Return in 1 year for annual physical or sooner as needed.     Tapan Frias DO  04/03/25   8:12 AM

## 2025-07-14 ENCOUNTER — TELEPHONE (OUTPATIENT)
Facility: CLINIC | Age: 30
End: 2025-07-14

## 2025-07-14 DIAGNOSIS — H18.609 KERATOCONUS, UNSPECIFIED LATERALITY: Primary | ICD-10-CM

## 2025-07-14 NOTE — TELEPHONE ENCOUNTER
[Last office visit was on 4/3/25 for physical exam]    Patient calling asking for a referral for ophthalmologist, he also needs to have a prescription for his eye glasses. He used to see Dr. Haywood, he states he has since retired. I made him aware I will convey the above to Dr. Frias. Patient verbalized understanding. No further questions or concerns at this time.    Dr. Frias - please see pended referral, sign if appropriate [otherwise cancel pended referral and advise]

## 2025-07-15 NOTE — TELEPHONE ENCOUNTER
Patient contacted (name and date of birth verified). Provider's results and recommendations reviewed with patient. Patient verbalizes understanding of the information, agrees with plan of care and offers no further questions at this time.    The Smartphone PhysicalT sent with ophthalmologist information

## 2025-07-15 NOTE — TELEPHONE ENCOUNTER
DR Frias =see below, any  different recommendation ? Thanks.     Please respond directly to the patient if no additional staff support is required.      Patient calling  (verified name and ).  States that Dr. Limon does not prescribe eyeglasses.     RN called [facility] at [phone] and [facility] at [phone]; both are specialists for retina and do not provide prescription eyeglasses.     RN sent [facility] at [phone] through [employer] while waiting for Dr. Frias's recommendation.         [email or web address] Luis Dior, MD Britney Hawk, MD Godfrey Young, MD Xin Guerrero, MD Scot Pickens, MD Girish Bhardwaj, MD Shaniqua Washington, MD Tanner Ramos, MD Jhoan Ramirez, MD Frieda Raya,   MD Javi Pierce, MD Al Borrego, MD Oliverio Bocanegra, MD Christopher Bocanegra, MD Yony Mcguire, MD Samir Milian, OD  Sayra Barth, OD  Irving Musa, OD Cornea  General ophthalmology  Glaucoma  Medical and surgical retina  Neuro-ophthalmology  Optometry  Pediatric ophthalmology      [facility]  [address], [address]  [place] [address] [phone]  [email or web address] MD Carmenza Tellez MD General ophthalmology  Pediatric ophthalmology  Cataract surgery   [facility]  [address] [place] [address], [address]  [place], [place] [id number] [phone]  [email or web address] MD Fran Worthy MD Kenneth Resnick, MD Hudson Stern, MD Aaron Weinberg, MD Management and surgery of retina and vitreous and related disorders   [facility]  Locations available in [place] and [facility]   [place]  [phone]  [email or web address]    MD Keyla Arora MD    Management and surgery of retina and vitreous and related disorders

## 2025-07-16 NOTE — TELEPHONE ENCOUNTER
Spoke to patient. Reviewed MD message. Verbalized understanding. No further questions at this time.

## 2025-08-11 ENCOUNTER — TELEPHONE (OUTPATIENT)
Facility: CLINIC | Age: 30
End: 2025-08-11

## (undated) NOTE — LETTER
AUTHORIZATION FOR SURGICAL OPERATION OR OTHER PROCEDURE    1. I hereby authorize Dr. Javi Fuentes , and [facility] staff assigned to my case to perform the following operation and/or procedure at the [facility] site:    _______________________________________________________________________________________________    Removal of fibroma of lip   _______________________________________________________________________________________________    2.  My physician has explained the nature and purpose of the operation or other procedure, possible alternative methods of treatment, the risks involved, and the possibility of complication to me.  I acknowledge that no guarantee has been made as to the result that may be obtained.  3.  I recognize that, during the course of this operation, or other procedure, unforseen conditions may necessitate additional or different procedure than those listed above.  I, therefore, further authorize and request that the above named physician, his/her physician assistants or designees perform such procedures as are, in his/her professional opinion, necessary and desirable.  4.  Any tissue or organs removed in the operation or other procedure may be disposed of by and at the discretion of the [facility] and [facility].  5.  I understand that in the event of a medical emergency, I will be transported by local paramedics to [facility] or other hospital emergency department.  6.  I certify that I have read and fully understand the above consent to operation and/or other procedure.    7.  I acknowledge that my physician has explained sedation/analgesia administration to me including the risks and benefits.  I consent to the administration of sedation/analgesia as may be necessary or desirable in the judgement of my physician.    Witness signature: ___________________________________________________ Date:  ______/______/_____                     Time:  ________ A.M.  P.M.       Patient Name:  ______________________________________________________  (please print)      Patient signature:  ___________________________________________________             Relationship to Patient:           []  Parent    Responsible person                          []  Spouse  In case of minor or                    [] Other  _____________   Incompetent name:  __________________________________________________                               (please print)      _____________      Responsible person  In case of minor or  Incompetent signature:  _______________________________________________    Statement of Physician  My signature below affirms that prior to the time of the procedure, I have explained to the patient and/or his/her guardian, the risks and benefits involved in the proposed treatment and any reasonable alternative to the proposed treatment.  I have also explained the risks and benefits involved in the refusal of the proposed treatment and have answered the patient's questions.                        Date:  ______/______/_______  Provider                      Signature:  __________________________________________________________       Time:  ___________ A.M    P.M.

## (undated) NOTE — LETTER
AUTHORIZATION FOR SURGICAL OPERATION OR OTHER PROCEDURE    1. I hereby authorize Dr. Fuentes, and [facility] staff assigned to my case to perform the following operation and/or procedure at the [facility] site:    ______________Excision and closure of Fibroma _________________________________________________________________________________      _______________________________________________________________________________________________    2.  My physician has explained the nature and purpose of the operation or other procedure, possible alternative methods of treatment, the risks involved, and the possibility of complication to me.  I acknowledge that no guarantee has been made as to the result that may be obtained.  3.  I recognize that, during the course of this operation, or other procedure, unforseen conditions may necessitate additional or different procedure than those listed above.  I, therefore, further authorize and request that the above named physician, his/her physician assistants or designees perform such procedures as are, in his/her professional opinion, necessary and desirable.  4.  Any tissue or organs removed in the operation or other procedure may be disposed of by and at the discretion of the [facility] and [facility].  5.  I understand that in the event of a medical emergency, I will be transported by local paramedics to [facility] or other hospital emergency department.  6.  I certify that I have read and fully understand the above consent to operation and/or other procedure.    7.  I acknowledge that my physician has explained sedation/analgesia administration to me including the risks and benefits.  I consent to the administration of sedation/analgesia as may be necessary or desirable in the judgement of my physician.    Witness signature: ___________________________________________________ Date:  ______/______/_____                     Time:  ________ A.M.  P.M.       Patient Name:  ______________________________________________________  (please print)      Patient signature:  ___________________________________________________             Relationship to Patient:           []  Parent    Responsible person                          []  Spouse  In case of minor or                    [] Other  _____________   Incompetent name:  __________________________________________________                               (please print)      _____________      Responsible person  In case of minor or  Incompetent signature:  _______________________________________________    Statement of Physician  My signature below affirms that prior to the time of the procedure, I have explained to the patient and/or his/her guardian, the risks and benefits involved in the proposed treatment and any reasonable alternative to the proposed treatment.  I have also explained the risks and benefits involved in the refusal of the proposed treatment and have answered the patient's questions.                        Date:  ______/______/_______  Provider                      Signature:  __________________________________________________________       Time:  ___________ A.M    P.M.

## (undated) NOTE — LETTER
May 1, 2024      No Recipients     Patient: Paul Kovacs   YOB: 1995   Date of Visit: 5/1/2024       Dear Dr. Justice Recipients:    Thank you for referring Paul Kovacs to me for evaluation. Here is my assessment and plan of care:    Paul Kovacs is a 29 year old male.    HPI:     HPI    Pt. Here for a complete eye exam.   C/O blurry vision for distance and tired eyes after prolonged computer work. States wants to see better.    have been seeing many doctors for CL or glasses or LASIK but all of them said the same ting that the vision could not be improved with any of them. (States was not a  candidate for LASIK \"because of the shape of the eyes\")   Last EE 4 years ago.      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       Last edited by Roberta Huddleston OT on 5/1/2024 11:02 AM.        Patient History:  Past Medical History:    COVID-19 vaccine series completed    second vaccine 04/29/2021. Series done at outside location.     Mild intermittent asthma without complication (HCC)       Surgical History: Paul Kovacs has a past surgical history that includes wisdom teeth removed.    Family History   Problem Relation Age of Onset    Macular degeneration Neg     Glaucoma Neg        Social History:   Social History     Socioeconomic History    Marital status:    Tobacco Use    Smoking status: Never    Smokeless tobacco: Former   Vaping Use    Vaping status: Some Days   Substance and Sexual Activity    Alcohol use: Yes    Drug use: Yes     Types: Cannabis    Sexual activity: Yes       Medications:  Current Outpatient Medications   Medication Sig Dispense Refill    albuterol 108 (90 Base) MCG/ACT Inhalation Aero Soln Inhale 2 puffs into the lungs every 6 (six) hours as needed for Wheezing. 2 each 1       Allergies:  Allergies   Allergen Reactions    Pea  Extract SWELLING     Tongue and throat swelling    Peanut Oil SWELLING     Tongue and throat swelling    Peanuts SWELLING    Peas SWELLING    Shrimp Flavor SWELLING       ROS:       PHYSICAL EXAM:     Base Eye Exam       Visual Acuity (Snellen - Linear)         Right Left    Dist sc 20/40 20/50 -2    Dist ph sc 20/20 20/30 +2    Near sc 20/20 20/25              Tonometry (Applanation, 11:31 AM)         Right Left    Pressure 13 14              Pupils         Pupils    Right PERRL    Left PERRL              Visual Fields         Left Right     Full Full              Extraocular Movement         Right Left     Full Full              Dilation       Both eyes: 1.0% Mydriacyl and 2.5% Sabino Synephrine @ 11:33 AM              Dilation #2       Both eyes: 1.0% Mydriacyl and 2.5% Sabino Synephrine @ 11:33 AM                  Additional Tests       Keratometry         K1 Axis K2 Axis Mires    Right 42.00 120 42.25 30     Left 41.50 170 43.25 80               Keratometry #2 (Manual- RJM)         K1 Axis K2 Axis Mires    Right 43.25  43.25 spheical clear    Left 42.75  44.25 90 clear                  Slit Lamp and Fundus Exam       Slit Lamp Exam         Right Left    Lids/Lashes Meibomian gland dysfunction Meibomian gland dysfunction    Conjunctiva/Sclera Normal Normal    Cornea no cone, or thinning minimal apical thinning centrally    Anterior Chamber Deep and quiet Deep and quiet    Iris Normal Normal    Lens Clear Clear    Vitreous Clear Clear              Fundus Exam         Right Left    Disc Good rim Good rim    C/D Ratio 0.5 0.5    Macula Normal Normal    Vessels Normal Normal    Periphery Normal Normal                  Refraction       Manifest Refraction (Auto)         Sphere Cylinder [place] Dist VA    Right -1.25 +0.25 010     Left -2.25 +2.25 075               Manifest Refraction #2         Sphere Cylinder [place] Dist VA    Right -1.25 +0.25 010 20/20    Left -2.25 +2.75 035 20/30              Manifest Refraction Comments     Feels slight improvement in the vision with glasses.   Requesting a new glasses Rx.              Cycloplegic Refraction (Auto)         Sphere Cylinder [place] Dist VA    Right -1.00 +0.25 005     Left -2.25 +2.75 075               Cycloplegic Refraction #2         Sphere Cylinder [place] Dist VA    Right -1.25 +0.25 010 20/20    Left -2.25 +2.25 035 20/30              Final Rx         Sphere Cylinder [place] Dist VA    Right -1.25 +0.25 010 20/20    Left -2.25 +2.75 035 20/30      Type: Single vision                     ASSESSMENT/PLAN:     Diagnoses and Plan:     Myopia of both eyes with astigmatism  Try glasses RX; if there is enough improvement, no further treatment is needed.  If he is still having problems with vision in the left eye to consider scheduling an appointment with Dr. Javier Benites or Dr. Sheridan at [facility].  Discussed with patient that he does not appear to have keratoconus in either eye.      No orders of the defined types were placed in this encounter.      Meds This Visit:  Requested Prescriptions      No prescriptions requested or ordered in this encounter        Follow up instructions:  Return if symptoms worsen or fail to improve.    5/1/2024  Scribed by: Scot Haywood MD        If you have questions, please do not hesitate to call me. I look forward to following Paul along with you.    Sincerely,        Scot Haywood MD        CC:   No Recipients    Document electronically generated by: Scot Haywood MD